# Patient Record
Sex: MALE | Race: WHITE
[De-identification: names, ages, dates, MRNs, and addresses within clinical notes are randomized per-mention and may not be internally consistent; named-entity substitution may affect disease eponyms.]

---

## 2019-06-17 ENCOUNTER — HOSPITAL ENCOUNTER (EMERGENCY)
Dept: HOSPITAL 53 - M ED | Age: 29
Discharge: HOME | End: 2019-06-17
Payer: COMMERCIAL

## 2019-06-17 VITALS — BODY MASS INDEX: 20.93 KG/M2 | WEIGHT: 149.47 LBS | HEIGHT: 71 IN

## 2019-06-17 VITALS — DIASTOLIC BLOOD PRESSURE: 67 MMHG | SYSTOLIC BLOOD PRESSURE: 126 MMHG

## 2019-06-17 DIAGNOSIS — M54.5: ICD-10-CM

## 2019-06-17 DIAGNOSIS — R51: Primary | ICD-10-CM

## 2019-06-17 DIAGNOSIS — F17.210: ICD-10-CM

## 2019-06-17 DIAGNOSIS — M54.2: ICD-10-CM

## 2020-02-29 ENCOUNTER — HOSPITAL ENCOUNTER (EMERGENCY)
Dept: HOSPITAL 53 - M ED | Age: 30
LOS: 1 days | Discharge: HOME | End: 2020-03-01
Payer: MEDICAID

## 2020-02-29 VITALS — WEIGHT: 140.3 LBS | HEIGHT: 71 IN | BODY MASS INDEX: 19.64 KG/M2

## 2020-02-29 DIAGNOSIS — N20.1: Primary | ICD-10-CM

## 2020-02-29 DIAGNOSIS — Z79.899: ICD-10-CM

## 2020-02-29 LAB
ALBUMIN SERPL BCG-MCNC: 4.7 GM/DL (ref 3.2–5.2)
ALT SERPL W P-5'-P-CCNC: 21 U/L (ref 12–78)
BASOPHILS # BLD AUTO: 0.1 10^3/UL (ref 0–0.2)
BASOPHILS NFR BLD AUTO: 0.8 % (ref 0–1)
BILIRUB CONJ SERPL-MCNC: 0.1 MG/DL (ref 0–0.2)
BILIRUB SERPL-MCNC: 0.3 MG/DL (ref 0.2–1)
EOSINOPHIL # BLD AUTO: 0.3 10^3/UL (ref 0–0.5)
EOSINOPHIL NFR BLD AUTO: 3.3 % (ref 0–3)
HCT VFR BLD AUTO: 45.2 % (ref 42–52)
HGB BLD-MCNC: 15.4 G/DL (ref 13.5–17.5)
LIPASE SERPL-CCNC: 86 U/L (ref 73–393)
LYMPHOCYTES # BLD AUTO: 2.3 10^3/UL (ref 1.5–5)
LYMPHOCYTES NFR BLD AUTO: 27.4 % (ref 24–44)
MCH RBC QN AUTO: 32 PG (ref 27–33)
MCHC RBC AUTO-ENTMCNC: 34.1 G/DL (ref 32–36.5)
MCV RBC AUTO: 93.8 FL (ref 80–96)
MONOCYTES # BLD AUTO: 0.5 10^3/UL (ref 0–0.8)
MONOCYTES NFR BLD AUTO: 5.6 % (ref 0–5)
NEUTROPHILS # BLD AUTO: 5.2 10^3/UL (ref 1.5–8.5)
NEUTROPHILS NFR BLD AUTO: 62.7 % (ref 36–66)
PLATELET # BLD AUTO: 245 10^3/UL (ref 150–450)
PROT SERPL-MCNC: 7.7 GM/DL (ref 6.4–8.2)
RBC # BLD AUTO: 4.82 10^6/UL (ref 4.3–6.1)
WBC # BLD AUTO: 8.3 10^3/UL (ref 4–10)

## 2020-02-29 PROCEDURE — 93041 RHYTHM ECG TRACING: CPT

## 2020-02-29 PROCEDURE — 83690 ASSAY OF LIPASE: CPT

## 2020-02-29 PROCEDURE — 74176 CT ABD & PELVIS W/O CONTRAST: CPT

## 2020-02-29 PROCEDURE — 80047 BASIC METABLC PNL IONIZED CA: CPT

## 2020-02-29 PROCEDURE — 87086 URINE CULTURE/COLONY COUNT: CPT

## 2020-02-29 PROCEDURE — 96374 THER/PROPH/DIAG INJ IV PUSH: CPT

## 2020-02-29 PROCEDURE — 80076 HEPATIC FUNCTION PANEL: CPT

## 2020-02-29 PROCEDURE — 81001 URINALYSIS AUTO W/SCOPE: CPT

## 2020-02-29 PROCEDURE — 99284 EMERGENCY DEPT VISIT MOD MDM: CPT

## 2020-02-29 PROCEDURE — 85025 COMPLETE CBC W/AUTO DIFF WBC: CPT

## 2020-02-29 NOTE — REPVR
PROCEDURE INFORMATION: 

Exam: CT Abdomen And Pelvis Without Contrast 

Exam date and time: 2/29/2020 11:04 PM 

Age: 29 years old 

Clinical indication: Abdominal pain; Flank; Left; Additional info: Left flank 

pain; R/O stone 



TECHNIQUE: 

Imaging protocol: Computed tomography of the abdomen and pelvis without 

contrast. 

Radiation optimization: All CT scans at this facility use at least one of these 

dose optimization techniques: automated exposure control; mA and/or kV 

adjustment per patient size (includes targeted exams where dose is matched to 

clinical indication); or iterative reconstruction. 



COMPARISON: 

No relevant prior studies available. 



FINDINGS: 



Liver: Normal. No mass. 

Gallbladder and bile ducts: Normal. No calcified stones. No ductal dilation. 

Pancreas: Normal. No ductal dilation. 

Spleen: Normal. No splenomegaly. 

Adrenals: Normal. No mass. 

Kidneys and ureters: 2 mm left vesicoureteral junction calculus causes minimal 

left renal collecting system dilatation/hydronephrosis. Non-obstructing left 

renal calculi measuring up to 3 mm. 

Stomach and bowel: Unremarkable. No obstruction. No mucosal thickening. 

Appendix: No evidence of appendicitis. 

Intraperitoneal space: Unremarkable. No free air. No significant fluid 

collection. 

Vasculature: Unremarkable. No abdominal aortic aneurysm. 

Lymph nodes: Unremarkable. No enlarged lymph nodes. 



Bladder: Unremarkable as visualized. 

Reproductive: Unremarkable as visualized. 

Bones/joints: L3-S1 disc bulge and protrusions with mild moderate stenosis. 

Soft tissues: Unremarkable. 



IMPRESSION: 

2 mm left vesicoureteral junction calculus causes minimal left renal collecting 

system dilatation/hydronephrosis. 



Electronically signed by: Stephon Gray On 02/29/2020  23:40:28 PM

## 2020-03-01 VITALS — SYSTOLIC BLOOD PRESSURE: 117 MMHG | DIASTOLIC BLOOD PRESSURE: 78 MMHG
